# Patient Record
Sex: FEMALE | URBAN - METROPOLITAN AREA
[De-identification: names, ages, dates, MRNs, and addresses within clinical notes are randomized per-mention and may not be internally consistent; named-entity substitution may affect disease eponyms.]

---

## 2019-09-24 ENCOUNTER — APPOINTMENT (RX ONLY)
Dept: URBAN - METROPOLITAN AREA CLINIC 13 | Facility: CLINIC | Age: 44
Setting detail: DERMATOLOGY
End: 2019-09-24

## 2019-09-24 DIAGNOSIS — L81.1 CHLOASMA: ICD-10-CM

## 2019-09-24 PROCEDURE — ? OBSERVATION

## 2019-09-24 PROCEDURE — ? TREATMENT REGIMEN

## 2019-09-24 PROCEDURE — 99202 OFFICE O/P NEW SF 15 MIN: CPT

## 2019-09-24 PROCEDURE — ? PRESCRIPTION

## 2019-09-24 PROCEDURE — ? COUNSELING

## 2019-09-24 RX ORDER — FLUOCINOLONE ACETONIDE, HYDROQUINONE, AND TRETINOIN .1; 40; .5 MG/G; MG/G; MG/G
CREAM TOPICAL QD
Qty: 1 | Refills: 3 | Status: ERX | COMMUNITY
Start: 2019-09-24

## 2019-09-24 RX ADMIN — FLUOCINOLONE ACETONIDE, HYDROQUINONE, AND TRETINOIN: .1; 40; .5 CREAM TOPICAL at 17:42

## 2019-09-24 ASSESSMENT — LOCATION SIMPLE DESCRIPTION DERM: LOCATION SIMPLE: RIGHT FOREHEAD

## 2019-09-24 ASSESSMENT — LOCATION DETAILED DESCRIPTION DERM: LOCATION DETAILED: RIGHT INFERIOR MEDIAL FOREHEAD

## 2019-09-24 ASSESSMENT — LOCATION ZONE DERM: LOCATION ZONE: FACE

## 2019-09-24 NOTE — HPI: SKIN LESION
What Type Of Note Output Would You Prefer (Optional)?: Standard Output
How Severe Is Your Skin Lesion?: moderate
Has Your Skin Lesion Been Treated?: not been treated
Is This A New Presentation, Or A Follow-Up?: Sun Spots
Additional History: Pt presents for evaluation of sun spots

## 2019-10-17 ENCOUNTER — APPOINTMENT (RX ONLY)
Dept: URBAN - METROPOLITAN AREA CLINIC 13 | Facility: CLINIC | Age: 44
Setting detail: DERMATOLOGY
End: 2019-10-17

## 2019-10-17 DIAGNOSIS — L81.1 CHLOASMA: ICD-10-CM

## 2019-10-17 PROBLEM — M12.9 ARTHROPATHY, UNSPECIFIED: Status: ACTIVE | Noted: 2019-10-17

## 2019-10-17 PROBLEM — F41.9 ANXIETY DISORDER, UNSPECIFIED: Status: ACTIVE | Noted: 2019-10-17

## 2019-10-17 PROBLEM — F32.9 MAJOR DEPRESSIVE DISORDER, SINGLE EPISODE, UNSPECIFIED: Status: ACTIVE | Noted: 2019-10-17

## 2019-10-17 PROCEDURE — ? CHEMICAL PEEL

## 2019-10-17 ASSESSMENT — LOCATION ZONE DERM: LOCATION ZONE: FACE

## 2019-10-17 ASSESSMENT — LOCATION SIMPLE DESCRIPTION DERM: LOCATION SIMPLE: INFERIOR FOREHEAD

## 2019-10-17 ASSESSMENT — LOCATION DETAILED DESCRIPTION DERM: LOCATION DETAILED: INFERIOR MID FOREHEAD

## 2019-10-17 NOTE — PROCEDURE: CHEMICAL PEEL
Detail Level: Zone
Expiration Date (Optional): 7/2021
Consent: Prior to the procedure, written consent was obtained and risks were reviewed, including but not limited to: redness, peeling, blistering, pigmentary change, scarring, infection, and pain.
Post Peel Care: Post-peel mask, Image Balancing Ormedic gel mask, was applied and left to sit with cool compresses. Patient tolerated peel well with minimal erythema. Patient felt little to no sensation. Sun protection and post-care instructions were reviewed with the patient. Patient was sent home with post peel kit.
Treatment Number: 1
Chemical Peel: Brightening Peel
Prep: The treated area was degreased with Image pre-peel cleanser. Peel: Image Lightening Lift Forte. Peel was on patient for 8 minutes.
Treatment Time (Optional): 1 hour
Booster To Be Used At Home?: no
Price (Use Numbers Only, No Special Characters Or $): 200
Post-Care Instructions: I reviewed with the patient in detail post-care instructions. Patient should avoid sun exposure and wear sun protection. Patient was instructed to use post peel kit for entire week after procedure. Patient is to no pick at face while peeling.